# Patient Record
Sex: FEMALE | Race: WHITE | NOT HISPANIC OR LATINO | URBAN - METROPOLITAN AREA
[De-identification: names, ages, dates, MRNs, and addresses within clinical notes are randomized per-mention and may not be internally consistent; named-entity substitution may affect disease eponyms.]

---

## 2017-01-02 ENCOUNTER — OUTPATIENT (OUTPATIENT)
Dept: OUTPATIENT SERVICES | Facility: HOSPITAL | Age: 82
LOS: 1 days | Discharge: HOME | End: 2017-01-02

## 2017-06-27 DIAGNOSIS — Z11.2 ENCOUNTER FOR SCREENING FOR OTHER BACTERIAL DISEASES: ICD-10-CM

## 2017-07-17 ENCOUNTER — OUTPATIENT (OUTPATIENT)
Dept: OUTPATIENT SERVICES | Facility: HOSPITAL | Age: 82
LOS: 1 days | Discharge: HOME | End: 2017-07-17

## 2017-07-17 DIAGNOSIS — E87.5 HYPERKALEMIA: ICD-10-CM

## 2017-07-21 ENCOUNTER — OUTPATIENT (OUTPATIENT)
Dept: OUTPATIENT SERVICES | Facility: HOSPITAL | Age: 82
LOS: 1 days | Discharge: HOME | End: 2017-07-21

## 2017-07-21 DIAGNOSIS — Z41.8 ENCOUNTER FOR OTHER PROCEDURES FOR PURPOSES OTHER THAN REMEDYING HEALTH STATE: ICD-10-CM

## 2017-10-04 ENCOUNTER — OUTPATIENT (OUTPATIENT)
Dept: OUTPATIENT SERVICES | Facility: HOSPITAL | Age: 82
LOS: 1 days | Discharge: HOME | End: 2017-10-04

## 2017-10-04 DIAGNOSIS — Z41.8 ENCOUNTER FOR OTHER PROCEDURES FOR PURPOSES OTHER THAN REMEDYING HEALTH STATE: ICD-10-CM

## 2018-02-23 ENCOUNTER — OUTPATIENT (OUTPATIENT)
Dept: OUTPATIENT SERVICES | Facility: HOSPITAL | Age: 83
LOS: 1 days | Discharge: HOME | End: 2018-02-23

## 2018-02-23 DIAGNOSIS — R78.81 BACTEREMIA: ICD-10-CM

## 2018-02-23 DIAGNOSIS — Z41.8 ENCOUNTER FOR OTHER PROCEDURES FOR PURPOSES OTHER THAN REMEDYING HEALTH STATE: ICD-10-CM

## 2018-11-21 ENCOUNTER — OUTPATIENT (OUTPATIENT)
Dept: OUTPATIENT SERVICES | Facility: HOSPITAL | Age: 83
LOS: 1 days | Discharge: HOME | End: 2018-11-21

## 2018-11-21 DIAGNOSIS — E83.51 HYPOCALCEMIA: ICD-10-CM

## 2019-04-09 PROBLEM — Z00.00 ENCOUNTER FOR PREVENTIVE HEALTH EXAMINATION: Status: ACTIVE | Noted: 2019-04-09

## 2019-05-14 ENCOUNTER — APPOINTMENT (OUTPATIENT)
Dept: NEUROLOGY | Facility: CLINIC | Age: 84
End: 2019-05-14

## 2019-06-10 ENCOUNTER — APPOINTMENT (OUTPATIENT)
Dept: NEUROLOGY | Facility: CLINIC | Age: 84
End: 2019-06-10
Payer: MEDICARE

## 2019-06-10 VITALS
DIASTOLIC BLOOD PRESSURE: 61 MMHG | HEART RATE: 88 BPM | WEIGHT: 115 LBS | SYSTOLIC BLOOD PRESSURE: 158 MMHG | BODY MASS INDEX: 20.38 KG/M2 | HEIGHT: 63 IN

## 2019-06-10 DIAGNOSIS — Z87.39 PERSONAL HISTORY OF OTHER DISEASES OF THE MUSCULOSKELETAL SYSTEM AND CONNECTIVE TISSUE: ICD-10-CM

## 2019-06-10 DIAGNOSIS — Z86.69 PERSONAL HISTORY OF OTHER DISEASES OF THE NERVOUS SYSTEM AND SENSE ORGANS: ICD-10-CM

## 2019-06-10 DIAGNOSIS — Z87.448 PERSONAL HISTORY OF OTHER DISEASES OF URINARY SYSTEM: ICD-10-CM

## 2019-06-10 DIAGNOSIS — E78.5 HYPERLIPIDEMIA, UNSPECIFIED: ICD-10-CM

## 2019-06-10 DIAGNOSIS — Z86.79 PERSONAL HISTORY OF OTHER DISEASES OF THE CIRCULATORY SYSTEM: ICD-10-CM

## 2019-06-10 DIAGNOSIS — Z86.59 PERSONAL HISTORY OF OTHER MENTAL AND BEHAVIORAL DISORDERS: ICD-10-CM

## 2019-06-10 PROCEDURE — 99215 OFFICE O/P EST HI 40 MIN: CPT

## 2019-06-10 RX ORDER — LIDOCAINE 5% 700 MG/1
5 PATCH TOPICAL
Qty: 30 | Refills: 5 | Status: ACTIVE | COMMUNITY
Start: 2019-06-10 | End: 1900-01-01

## 2019-06-10 NOTE — HISTORY OF PRESENT ILLNESS
[FreeTextEntry1] : Since her last visit, Ms Silvana Tan has not had any additional episodes of seizures and has been off lamotrigine.   Has had improvement in tremors since increasing primidone to 50 mg BID without significant side effects.  Lightheadedness and dizziness has improved after decreasing amlodipine.  Has not had aortic valve repair since symptoms improved after dropping antiHTN meds.  Is following up with her cardiologists regularly.  \par \par More recently has noted severe burning dysesthesias during dialysis involving the right hand from the wrist down affecting digits 1 - 5 without discoloration.  Symptoms involve palm > dorsum.  Not associated with weakness.  Occurs during dialysis since fistula on right side and pt is immobilized, typically occuring 30 mins into HD.  Denies any neck or shoulder pain.  Denies any forearm pain.  No pain during catheter insertion at start of dialysis.  Has episodes when she's not on dialysis starts as paresthesias progessing to burning but improved with position change which she can't do during HD.  states having her arm hand at her side with supination improves symptoms.

## 2019-06-10 NOTE — PHYSICAL EXAM
[FreeTextEntry1] : NAD. AOx3.  Intact memory.  Speech fluent, nondysarthric.  CN 2 - 12 normal.  \par Strength 5/5 b/l UE/LE.  APB R 3/5 no TTP.  no discoloration in the hand.  R hand WF/WE/FF/FE 5.  severe arthritis b/l UE.  high frequency low amplitude tremors with titubation slightly improved from prior.   DTRs 2+ throughout.  Plantar response downgoing b/l.  (-) Hoffmans, clonus.  Sensory intact LT/PP, pain, temp, proprioception and vibration. Mildly (+) Tinels R wrist, (-) tinels or reproducible pain in the arm or fistula site.    NL FTN/HKS.  No dysdiadokinesia.  Gait narrow based/NL tandem.  \par

## 2019-06-10 NOTE — ASSESSMENT
[FreeTextEntry1] : 86 yo F w/ MMP including essential tremor currently improved on increased primidone and resolved lightheadedness secondary to orthostasis now p/w severe burning dysesthesias during hemodialysis improved with position changes atypical for carpal tunnel syndrome, possible proximal neuralgia.  possible claudication.  \par \par Plan:\par - recommend EMG/NCS RUE\par - start neurontin 300 mg prior to each  dialysis\par - lidoderm patch during dialysis days\par - consider having on-site renal physician evaluate for pulse, discoloration during dialysis\par - consider additional vascular evaluation if emg/neurologic w/u (-)\par - RTC in 2 - 4 weeks for electrodiagnostic studies and followup

## 2019-07-06 ENCOUNTER — MESSAGE (OUTPATIENT)
Age: 84
End: 2019-07-06

## 2019-07-06 ENCOUNTER — MOBILE ON CALL (OUTPATIENT)
Age: 84
End: 2019-07-06

## 2019-07-09 ENCOUNTER — APPOINTMENT (OUTPATIENT)
Dept: NEUROLOGY | Facility: CLINIC | Age: 84
End: 2019-07-09

## 2019-07-15 ENCOUNTER — CLINICAL ADVICE (OUTPATIENT)
Age: 84
End: 2019-07-15

## 2019-07-18 ENCOUNTER — APPOINTMENT (OUTPATIENT)
Dept: NEUROLOGY | Facility: CLINIC | Age: 84
End: 2019-07-18

## 2019-07-26 ENCOUNTER — OUTPATIENT (OUTPATIENT)
Dept: OUTPATIENT SERVICES | Facility: HOSPITAL | Age: 84
LOS: 1 days | Discharge: HOME | End: 2019-07-26

## 2019-07-27 DIAGNOSIS — D64.9 ANEMIA, UNSPECIFIED: ICD-10-CM

## 2019-08-27 ENCOUNTER — APPOINTMENT (OUTPATIENT)
Dept: NEUROLOGY | Facility: CLINIC | Age: 84
End: 2019-08-27

## 2019-09-12 ENCOUNTER — APPOINTMENT (OUTPATIENT)
Dept: NEUROLOGY | Facility: CLINIC | Age: 84
End: 2019-09-12

## 2019-10-31 ENCOUNTER — APPOINTMENT (OUTPATIENT)
Dept: NEUROLOGY | Facility: CLINIC | Age: 84
End: 2019-10-31

## 2019-12-03 ENCOUNTER — APPOINTMENT (OUTPATIENT)
Dept: NEUROLOGY | Facility: CLINIC | Age: 84
End: 2019-12-03

## 2019-12-05 ENCOUNTER — APPOINTMENT (OUTPATIENT)
Dept: NEUROLOGY | Facility: CLINIC | Age: 84
End: 2019-12-05
Payer: MEDICARE

## 2019-12-05 VITALS
OXYGEN SATURATION: 93 % | DIASTOLIC BLOOD PRESSURE: 69 MMHG | WEIGHT: 125 LBS | SYSTOLIC BLOOD PRESSURE: 154 MMHG | TEMPERATURE: 97.9 F | HEIGHT: 63 IN | HEART RATE: 90 BPM | BODY MASS INDEX: 22.15 KG/M2

## 2019-12-05 DIAGNOSIS — M79.2 NEURALGIA AND NEURITIS, UNSPECIFIED: ICD-10-CM

## 2019-12-05 DIAGNOSIS — R41.89 OTHER SYMPTOMS AND SIGNS INVOLVING COGNITIVE FUNCTIONS AND AWARENESS: ICD-10-CM

## 2019-12-05 PROCEDURE — 99214 OFFICE O/P EST MOD 30 MIN: CPT

## 2019-12-05 RX ORDER — GABAPENTIN 300 MG/1
300 CAPSULE ORAL DAILY
Qty: 30 | Refills: 2 | Status: DISCONTINUED | COMMUNITY
Start: 2019-06-10 | End: 2019-12-05

## 2019-12-05 NOTE — ASSESSMENT
[FreeTextEntry1] : 87 yo F w/ MMP including essential tremor currently improved and transient RUE pain possibly traumatic secondary to repeated fistula access during dialysis without vascular compromise currently with cognitive impairment.  Recommend w/u for cognitive impairment since new.  Will also check RUE NCS/EMG for underlying CTS and possible proximal neuropathy related to fistula site since never done.  Since pt has not been on neurontin or primidone and tremor/neuralgia relatively under control off medications, will hold off on restarting medications.   \par \par Plan:\par - recommend EMG/NCS RUE\par - hold neurontin and primidone\par - check b/w\par - MRI Brain NC\par - RTC in 4- 6  weeks for electrodiagnostic studies and followup\par - f/u with cardiology regarding valvular issues

## 2019-12-05 NOTE — PHYSICAL EXAM
[FreeTextEntry1] : NAD. AOx3.  Intact memory.  Speech fluent, nondysarthric.  CN 2 - 12 normal.  \par Strength 5/5 b/l UE/LE.  APB 3/5 b/l no TTP.  no discoloration in the hand.  R hand WF/WE/FF/FE 5.  severe rheumatoid arthritis b/l UE.  high frequency low amplitude tremors with titubation slightly improved from prior.   DTRs 2+ throughout.  Plantar response downgoing b/l.  (-) Hoffmans, clonus.  Sensory intact LT/PP, pain, temp, proprioception and vibration. Mildly (+) Tinels R wrist, (-) tinels or reproducible pain in the arm or fistula site.  (+) TTP R ventormedial forearm near antecubital fossa without radiation.    NL FTN/HKS.  No dysdiadokinesia.  Gait narrow based/NL tandem.  \par

## 2019-12-12 ENCOUNTER — APPOINTMENT (OUTPATIENT)
Dept: NEUROLOGY | Facility: CLINIC | Age: 84
End: 2019-12-12

## 2020-01-28 ENCOUNTER — APPOINTMENT (OUTPATIENT)
Dept: NEUROLOGY | Facility: CLINIC | Age: 85
End: 2020-01-28

## 2020-01-29 ENCOUNTER — APPOINTMENT (OUTPATIENT)
Dept: NEUROLOGY | Facility: CLINIC | Age: 85
End: 2020-01-29

## 2020-03-31 ENCOUNTER — EMERGENCY (EMERGENCY)
Facility: HOSPITAL | Age: 85
LOS: 0 days | Discharge: NURSING, REHAB OR LONG-TERM CARE | End: 2020-04-01
Attending: EMERGENCY MEDICINE | Admitting: EMERGENCY MEDICINE
Payer: MEDICARE

## 2020-03-31 VITALS
WEIGHT: 139.99 LBS | HEIGHT: 65 IN | SYSTOLIC BLOOD PRESSURE: 172 MMHG | RESPIRATION RATE: 20 BRPM | HEART RATE: 78 BPM | TEMPERATURE: 100 F | DIASTOLIC BLOOD PRESSURE: 84 MMHG | OXYGEN SATURATION: 100 %
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
## 2020-03-31 DIAGNOSIS — N18.6 END STAGE RENAL DISEASE: ICD-10-CM

## 2020-03-31 DIAGNOSIS — S09.90XA UNSPECIFIED INJURY OF HEAD, INITIAL ENCOUNTER: ICD-10-CM

## 2020-03-31 DIAGNOSIS — F03.90 UNSPECIFIED DEMENTIA WITHOUT BEHAVIORAL DISTURBANCE: ICD-10-CM

## 2020-03-31 DIAGNOSIS — R41.82 ALTERED MENTAL STATUS, UNSPECIFIED: ICD-10-CM

## 2020-03-31 DIAGNOSIS — S01.81XA LACERATION WITHOUT FOREIGN BODY OF OTHER PART OF HEAD, INITIAL ENCOUNTER: ICD-10-CM
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
## 2020-03-31 DIAGNOSIS — Z79.01 LONG TERM (CURRENT) USE OF ANTICOAGULANTS: ICD-10-CM

## 2020-03-31 DIAGNOSIS — I48.91 UNSPECIFIED ATRIAL FIBRILLATION: ICD-10-CM

## 2020-03-31 LAB
ALBUMIN SERPL ELPH-MCNC: 3.6 G/DL — SIGNIFICANT CHANGE UP (ref 3.5–5.2)
ALP SERPL-CCNC: 90 U/L — SIGNIFICANT CHANGE UP (ref 30–115)
ALT FLD-CCNC: 9 U/L — SIGNIFICANT CHANGE UP (ref 0–41)
ANION GAP SERPL CALC-SCNC: 9 MMOL/L — SIGNIFICANT CHANGE UP (ref 7–14)
APTT BLD: 24.3 SEC — LOW (ref 27–39.2)
AST SERPL-CCNC: 28 U/L — SIGNIFICANT CHANGE UP (ref 0–41)
BASOPHILS # BLD AUTO: 0.03 K/UL — SIGNIFICANT CHANGE UP (ref 0–0.2)
BASOPHILS NFR BLD AUTO: 0.8 % — SIGNIFICANT CHANGE UP (ref 0–1)
BILIRUB DIRECT SERPL-MCNC: <0.2 MG/DL — SIGNIFICANT CHANGE UP (ref 0–0.2)
BILIRUB INDIRECT FLD-MCNC: >0.1 MG/DL — LOW (ref 0.2–1.2)
BILIRUB SERPL-MCNC: 0.3 MG/DL — SIGNIFICANT CHANGE UP (ref 0.2–1.2)
BUN SERPL-MCNC: 15 MG/DL — SIGNIFICANT CHANGE UP (ref 10–20)
CALCIUM SERPL-MCNC: 10.4 MG/DL — HIGH (ref 8.5–10.1)
CHLORIDE SERPL-SCNC: 94 MMOL/L — LOW (ref 98–110)
CO2 SERPL-SCNC: 32 MMOL/L — SIGNIFICANT CHANGE UP (ref 17–32)
CREAT SERPL-MCNC: 2.5 MG/DL — HIGH (ref 0.7–1.5)
EOSINOPHIL # BLD AUTO: 0.14 K/UL — SIGNIFICANT CHANGE UP (ref 0–0.7)
EOSINOPHIL NFR BLD AUTO: 3.5 % — SIGNIFICANT CHANGE UP (ref 0–8)
GLUCOSE SERPL-MCNC: 105 MG/DL — HIGH (ref 70–99)
HCT VFR BLD CALC: 33.2 % — LOW (ref 37–47)
HGB BLD-MCNC: 10.9 G/DL — LOW (ref 12–16)
IMM GRANULOCYTES NFR BLD AUTO: 0 % — LOW (ref 0.1–0.3)
INR BLD: 2.98 RATIO — HIGH (ref 0.65–1.3)
LIDOCAIN IGE QN: 14 U/L — SIGNIFICANT CHANGE UP (ref 7–60)
LYMPHOCYTES # BLD AUTO: 1.4 K/UL — SIGNIFICANT CHANGE UP (ref 1.2–3.4)
LYMPHOCYTES # BLD AUTO: 35.2 % — SIGNIFICANT CHANGE UP (ref 20.5–51.1)
MAGNESIUM SERPL-MCNC: 1.6 MG/DL — LOW (ref 1.8–2.4)
MCHC RBC-ENTMCNC: 32.8 G/DL — SIGNIFICANT CHANGE UP (ref 32–37)
MCHC RBC-ENTMCNC: 34 PG — HIGH (ref 27–31)
MCV RBC AUTO: 103.4 FL — HIGH (ref 81–99)
MONOCYTES # BLD AUTO: 0.52 K/UL — SIGNIFICANT CHANGE UP (ref 0.1–0.6)
MONOCYTES NFR BLD AUTO: 13.1 % — HIGH (ref 1.7–9.3)
NEUTROPHILS # BLD AUTO: 1.89 K/UL — SIGNIFICANT CHANGE UP (ref 1.4–6.5)
NEUTROPHILS NFR BLD AUTO: 47.4 % — SIGNIFICANT CHANGE UP (ref 42.2–75.2)
NRBC # BLD: 0 /100 WBCS — SIGNIFICANT CHANGE UP (ref 0–0)
PLATELET # BLD AUTO: 143 K/UL — SIGNIFICANT CHANGE UP (ref 130–400)
POTASSIUM SERPL-MCNC: 4.8 MMOL/L — SIGNIFICANT CHANGE UP (ref 3.5–5)
POTASSIUM SERPL-SCNC: 4.8 MMOL/L — SIGNIFICANT CHANGE UP (ref 3.5–5)
PROT SERPL-MCNC: 6.7 G/DL — SIGNIFICANT CHANGE UP (ref 6–8)
PROTHROM AB SERPL-ACNC: 34.3 SEC — HIGH (ref 9.95–12.87)
RBC # BLD: 3.21 M/UL — LOW (ref 4.2–5.4)
RBC # FLD: 16.4 % — HIGH (ref 11.5–14.5)
SODIUM SERPL-SCNC: 135 MMOL/L — SIGNIFICANT CHANGE UP (ref 135–146)
WBC # BLD: 3.98 K/UL — LOW (ref 4.8–10.8)
WBC # FLD AUTO: 3.98 K/UL — LOW (ref 4.8–10.8)

## 2020-03-31 PROCEDURE — 99284 EMERGENCY DEPT VISIT MOD MDM: CPT | Mod: 25

## 2020-03-31 PROCEDURE — 71045 X-RAY EXAM CHEST 1 VIEW: CPT | Mod: 26

## 2020-03-31 PROCEDURE — 74177 CT ABD & PELVIS W/CONTRAST: CPT | Mod: 26

## 2020-03-31 PROCEDURE — 72125 CT NECK SPINE W/O DYE: CPT | Mod: 26

## 2020-03-31 PROCEDURE — 12052 INTMD RPR FACE/MM 2.6-5.0 CM: CPT

## 2020-03-31 PROCEDURE — 70450 CT HEAD/BRAIN W/O DYE: CPT | Mod: 26

## 2020-03-31 PROCEDURE — 71260 CT THORAX DX C+: CPT | Mod: 26

## 2020-03-31 NOTE — ED PROVIDER NOTE - PHYSICAL EXAMINATION
Physical Exam    Vital Signs: I have reviewed the initial vital signs.  Constitutional: elderly and frail, no acute distress  Eyes: Conjunctiva pink, Sclera clear, PERRLA, EOMI.  Cardiovascular: S1 and S2, regular rate, regular rhythm, well-perfused extremities, radial pulses equal and 2+  Respiratory: unlabored respiratory effort, clear to auscultation bilaterally no wheezing, rales and rhonchi  Gastrointestinal: soft, non-tender abdomen, no pulsatile mass, normal bowl sounds  Musculoskeletal: supple neck, no lower extremity edema, no midline tenderness  Integumentary: warm, dry, +Right forehead/eyebrow laceration 4.0cm irregular  Neurologic: awake, alert, cranial nerves II-XII grossly intact, extremities’ motor and sensory functions grossly intact  Psychiatric: appropriate mood, appropriate affect

## 2020-03-31 NOTE — ED PROVIDER NOTE - NSFOLLOWUPINSTRUCTIONS_ED_ALL_ED_FT
Head Injury, Adult  ImageThere are many types of head injuries. Head injuries can be as minor as a bump, or they can be more severe. More severe head injuries include:    A jarring injury to the brain (concussion).  A bruise of the brain (contusion). This means there is bleeding in the brain that can cause swelling.  A cracked skull (skull fracture).  Bleeding in the brain that collects, clots, and forms a bump (hematoma).    After a head injury, you may need to be observed for a while in the emergency department or urgent care. Sometimes admission to the hospital is needed.    After a head injury has happened, most problems occur within the first 24 hours, but side effects may occur up to 7–10 days after the injury. It is important to watch your condition for any changes.    What are the causes?  There are many possible causes of a head injury. A serious head injury may happen to someone who is in a car accident (motor vehicle collision). Other causes of major head injuries include bicycle or motorcycle accidents, sports injuries, and falls.    Risk factors  This condition is more likely to occur in people who:    Drink a lot of alcohol or use drugs.  Are over the age of 65.  Are at risk for falls.    What are the symptoms?  There are many possible symptoms of a head injury. Visible symptoms of a head injury include a bruise, bump, or bleeding at the site of the injury. Other non-visible symptoms include:    Feeling sleepy or not being able to stay awake.  Passing out.  Headache.  Seizures.  Dizziness.  Confusion.  Memory problems.  Nausea or vomiting.    Other possible symptoms that may develop after the head injury include:    Poor attention and concentration.  Fatigue or tiring easily.  Irritability.  Being uncomfortable around bright lights or loud noises.  Anxiety or depression.  Disturbed sleep.    How is this diagnosed?  This condition can usually be diagnosed based on your symptoms, a description of the injury, and a physical exam. You may also have imaging tests done, such as a CT scan or MRI. You will also be closely watched.    How is this treated?  Treatment for this condition depends on the severity and type of injury you have. The main goal of treatment is to prevent complications and allow the brain time to heal.    For mild head injury, you may be sent home and treatment may include:    Observation. A responsible adult should stay with you for 24 hours after your injury and check on you often.  Physical rest.  Brain rest.  Pain medicines.    For severe brain injury, treatment may include:    Close observation. This includes hospitalization with frequent physical exams. You may need to go to a hospital that specializes in head injury.  Pain medicines.  Breathing support. This may include using a ventilator.  Managing the pressure inside the brain (intracranial pressure, or ICP). This may include:    Monitoring the ICP.  Giving medicines to decrease the ICP.  Positioning you to decrease the ICP.    Medicine to prevent seizures.  Surgery to stop bleeding or to remove blood clots (craniotomy).  Surgery to remove part of the skull (decompressive craniectomy). This allows room for the brain to swell.    Follow these instructions at home:  Activity     Rest as much as possible and avoid activities that are physically hard or tiring.  Make sure you get enough sleep.  Limit activities that require a lot of thought or attention, such as:    Watching TV.  Playing memory games and puzzles.  Job-related work or homework.  Working on the computer, social media, and texting.    Avoid activities that could cause another head injury, such as playing sports, until your health care provider approves. Having another head injury, especially before the first one has healed, can be dangerous.  Ask your health care provider when it is safe for you to return to your regular activities, including work or school. Ask your health care provider for a step-by-step plan for gradually returning to activities.  Ask your health care provider when you can drive, ride a bicycle, or use heavy machinery. Your ability to react may be slower after a brain injury. Never do these activities if you are dizzy.    Lifestyle     Do not drink alcohol until your health care provider approves, and avoid drug use. Alcohol and certain drugs may slow your recovery and can put you at risk of further injury.  If it is harder than usual to remember things, write them down.  If you are easily distracted, try to do one thing at a time.  Talk with family members or close friends when making important decisions.  Tell your friends, family, a trusted colleague, and  about your injury, symptoms, and restrictions. Have them watch for any new or worsening problems.    General instructions     Take over-the-counter and prescription medicines only as told by your health care provider.  Have someone stay with you for 24 hours after your head injury. This person should watch you for any changes in your symptoms and be ready to seek medical help, as needed.  Keep all follow-up visits as told by your health care provider. This is important.    Prevention  Work on improving your balance and strength to avoid falls.  Wear a seatbelt when you are in a moving vehicle.  Wear a helmet when riding a bicycle, skiing, or doing any other sport or activity that has a risk of injury.  Drink alcohol only in moderation.  Take safety measures in your home, such as:    Removing clutter and tripping hazards from floors and stairways.  Using grab bars in bathrooms and handrails by stairs.  Placing non-slip mats on floors and in bathtubs.  Improving lighting in dim areas.    Get help right away if:  You have:    A severe headache that is not helped by medicine.  Trouble walking, have weakness in your arms and legs, or lose your balance.  Clear or bloody fluid coming from your nose or ears.  Changes in your vision.  A seizure.    You vomit.  Your symptoms get worse.  Your speech is slurred.  You pass out.  You are sleepier and have trouble staying awake.  Your pupils change size.  These symptoms may represent a serious problem that is an emergency. Do not wait to see if the symptoms will go away. Get medical help right away. Call your local emergency services (911 in the [place] Do not drive yourself to the hospital.     This information is not intended to replace advice given to you by your health care provider. Make sure you discuss any questions you have with your health care provider..

## 2020-03-31 NOTE — ED PROVIDER NOTE - CARE PLAN
Principal Discharge DX:	[facility] (closed head injury)  Secondary Diagnosis:	Laceration of face  Secondary Diagnosis:	Tetanus-diphtheria (Td) vaccination  Secondary Diagnosis:	Fall

## 2020-03-31 NOTE — ED ADULT TRIAGE NOTE - CHIEF COMPLAINT QUOTE
BIBA from [facility], laceration to forehead, pt fell from bed. pt unable to state what happened. pt on blood thinners

## 2020-03-31 NOTE — ED PROVIDER NOTE - ATTENDING CONTRIBUTION TO CARE
I personally evaluated the patient. I reviewed the Resident’s or Physician Assistant’s note (as assigned above), and agree with the findings and plan except as documented in my note.
Yes

## 2020-03-31 NOTE — ED PROVIDER NOTE - CLINICAL SUMMARY MEDICAL DECISION MAKING FREE TEXT BOX
86yF pmhx  afib - coumadin,  ESRD (kenny; m,t,thr,sat),  had  dialysis today  brought in from [facility] after  being found on floor.  Per staff,  pt wheelchair and hoyet lift ,  staff heard bang  went into room, saw patient on floor, right side  awake alert, baseline mental status ( knows self ,  c/o pain to head - on coumadin.   Alert , GCS 15.  PERRL, EOMI, no entrapment.  No raccoon or warren sign.  hematima laceration to right forehead Neck is supple, no midline C-Spine tenderness.  CN 2-12 intact.  Motor strength and sensory response is symmetric.  CB intact.  CVS irregular, Resp CTA b/l.  No distress, speaking clearly.  abd soft NT/ND.  No shoulder, elbow or hand tenderness.  2+ Radial pulse b/l and equal. Full ROM at all joints of b/l UE. No midline vertebral tenderness.  No rib tenderness, no crepitus. No ecchymosis to abd wall, back wall, or chest wall. Pelvis is stable. Hips non tender.  Full ROM at hips, knees and ankles.  No shortening, no rotation.  NVI distally.  labs reviewed   chronic renal failure no hyperK, no acidosis,  CT's for trauma done 86yF pmhx  afib - coumadin,  ESRD (kenny; m,t,thr,sat),  had  dialysis today  brought in from [facility] after  being found on floor.  Per staff,  pt wheelchair and hoyet lift ,  staff heard bang  went into room, saw patient on floor, right side  awake alert, baseline mental status ( knows self ,  c/o pain to head - on coumadin.   Alert , GCS 15.  PERRL, EOMI, no entrapment.  No raccoon or warren sign.  hematima laceration to right forehead Neck is supple, no midline C-Spine tenderness.  CN 2-12 intact.  Motor strength and sensory response is symmetric.  CB intact.  CVS irregular, Resp CTA b/l.  No distress, speaking clearly.  abd soft NT/ND.  No shoulder, elbow or hand tenderness.  2+ Radial pulse b/l and equal. Full ROM at all joints of b/l UE. No midline vertebral tenderness.  No rib tenderness, no crepitus. No ecchymosis to abd wall, back wall, or chest wall. Pelvis is stable. Hips non tender.  Full ROM at hips, knees and ankles.  No shortening, no rotation.  NVI distally.  labs reviewed   chronic renal failure no hyperK, no acidosis,  CT's for trauma done negative  for acute trauma -  dw daughter inciddental findings  including thyroid nodule

## 2020-03-31 NOTE — ED PROVIDER NOTE - OBJECTIVE STATEMENT
86 year old female past medical history of CVA, ESRD on comadin comes to emergency room status post fall. As per group home patient found on floor next to bed lying on right side and had laceration and called EMS to bring to hospital. Pt in emergency room is confused responds to name, as per NH staff she was acting like her normal self which is confused orientated to person.

## 2020-03-31 NOTE — ED PROVIDER NOTE - PATIENT PORTAL LINK FT
You can access the FollowMyHealth Patient Portal offered by [facility] by registering at the following website: [email or web address]. By joining [facility]’s FollowMyHealth portal, you will also be able to view your health information using other applications (apps) compatible with our system.

## 2020-04-01 VITALS
OXYGEN SATURATION: 100 % | SYSTOLIC BLOOD PRESSURE: 177 MMHG | HEART RATE: 97 BPM | DIASTOLIC BLOOD PRESSURE: 79 MMHG | RESPIRATION RATE: 18 BRPM

## 2023-02-21 NOTE — ED PROVIDER NOTE - CCCP TRG CHIEF CMPLNT
Received new rx request from  pharmacy. Oxytocin/Vitamin E 60 U/ 200IU Cream - Insert 1ML vaginally for 7 days then 2-3 times per week thereafter and up to 50 minutes before sexual activity. Amairani Concepcion signed the request with two refills. Sent to pharmacy.   
fall

## 2023-05-10 NOTE — HISTORY OF PRESENT ILLNESS
[FreeTextEntry1] : Since her last visit, Ms Silvana Tan has had improvement in RUE pain.  has been getting injections for her CTS from hand surgery which seems to help with last injection 1 month ago.  Denies any weakness or  problems.  Continues to have episodes of pain during dialysis when fistula is accessed.  Never followed up for EMG/NCS.  Denies any new symptoms but reports occasional pain in the R proximal arm and armpit transient not associated with dialysis.  Denies any swelling or discoloration.  Denies any neck pain.  Tremors are currently mild.  Pt reports compliance with primidone and neurontin but family states pt had not been taking medications and has been having memory issues with confusion particularly not knowing AM from PM in middle of the night or forgetful about when her dialysis is scheduled.  Has a HHA at home who helps with ADLs.  Denies any problems getting lost but rarely leaves house alone.  Denies any problems getting dressed.  Doesn't cook for herself.  Denies any hallucinations or psychosis.  Family states that pt cognitive impairment ongoing over the last several months.  Pt currently awaiting cardiology evaluation and followup for aortic valve issues.  \par \par More recently has noted severe burning dysesthesias during dialysis involving the right hand from the wrist down affecting digits 1 - 5 without discoloration.  Symptoms involve palm > dorsum.  Not associated with weakness.  Occurs during dialysis since fistula on right side and pt is immobilized, typically occuring 30 mins into HD.  Denies any neck or shoulder pain.  Denies any forearm pain.  No pain during catheter insertion at start of dialysis.  Has episodes when she's not on dialysis starts as paresthesias progessing to burning but improved with position change which she can't do during HD.  states having her arm hand at her side with supination improves symptoms.   No